# Patient Record
Sex: FEMALE | Race: WHITE | NOT HISPANIC OR LATINO | Employment: UNEMPLOYED | URBAN - METROPOLITAN AREA
[De-identification: names, ages, dates, MRNs, and addresses within clinical notes are randomized per-mention and may not be internally consistent; named-entity substitution may affect disease eponyms.]

---

## 2023-03-16 PROBLEM — J45.909 ASTHMA (HHS-HCC): Status: ACTIVE | Noted: 2023-03-16

## 2023-03-16 PROBLEM — G56.00 CTS (CARPAL TUNNEL SYNDROME): Status: ACTIVE | Noted: 2023-03-16

## 2023-03-16 PROBLEM — M25.539 WRIST PAIN: Status: ACTIVE | Noted: 2023-03-16

## 2023-03-16 PROBLEM — F32.A DEPRESSION: Status: ACTIVE | Noted: 2023-03-16

## 2023-03-16 PROBLEM — G47.33 MILD OBSTRUCTIVE SLEEP APNEA: Status: ACTIVE | Noted: 2023-03-16

## 2023-03-16 PROBLEM — M25.569 KNEE PAIN: Status: ACTIVE | Noted: 2023-03-16

## 2023-03-16 PROBLEM — E55.9 VITAMIN D DEFICIENCY: Status: ACTIVE | Noted: 2023-03-16

## 2023-03-16 PROBLEM — R53.83 FATIGUE: Status: ACTIVE | Noted: 2023-03-16

## 2023-03-16 RX ORDER — ESCITALOPRAM OXALATE 10 MG/1
1 TABLET ORAL DAILY
COMMUNITY
Start: 2022-12-13 | End: 2023-03-20 | Stop reason: SDUPTHER

## 2023-03-16 RX ORDER — ALBUTEROL SULFATE 90 UG/1
2 AEROSOL, METERED RESPIRATORY (INHALATION) EVERY 4 HOURS PRN
COMMUNITY

## 2023-03-16 RX ORDER — AMITRIPTYLINE HYDROCHLORIDE 10 MG/1
TABLET, FILM COATED ORAL
COMMUNITY
Start: 2022-12-13 | End: 2023-03-23 | Stop reason: ALTCHOICE

## 2023-03-16 RX ORDER — ESCITALOPRAM OXALATE 20 MG/1
1 TABLET ORAL DAILY
COMMUNITY
End: 2023-03-20 | Stop reason: SDUPTHER

## 2023-03-16 RX ORDER — NORETHINDRONE ACETATE AND ETHINYL ESTRADIOL .03; 1.5 MG/1; MG/1
1 TABLET ORAL DAILY
COMMUNITY
Start: 2021-10-07 | End: 2023-07-18 | Stop reason: SDUPTHER

## 2023-03-16 RX ORDER — FLUTICASONE PROPIONATE 50 MCG
1 SPRAY, SUSPENSION (ML) NASAL 2 TIMES DAILY
COMMUNITY
Start: 2021-11-12

## 2023-03-16 RX ORDER — CALCIUM CARB/VITAMIN D3/VIT K1 500-500-40
2 TABLET,CHEWABLE ORAL DAILY
COMMUNITY
Start: 2021-11-09

## 2023-03-20 ENCOUNTER — OFFICE VISIT (OUTPATIENT)
Dept: PRIMARY CARE | Facility: CLINIC | Age: 28
End: 2023-03-20
Payer: OTHER GOVERNMENT

## 2023-03-20 VITALS
WEIGHT: 173 LBS | HEIGHT: 61 IN | DIASTOLIC BLOOD PRESSURE: 76 MMHG | TEMPERATURE: 97.3 F | SYSTOLIC BLOOD PRESSURE: 122 MMHG | OXYGEN SATURATION: 98 % | RESPIRATION RATE: 16 BRPM | HEART RATE: 73 BPM | BODY MASS INDEX: 32.66 KG/M2

## 2023-03-20 DIAGNOSIS — E55.9 VITAMIN D DEFICIENCY: ICD-10-CM

## 2023-03-20 DIAGNOSIS — F32.A DEPRESSION, UNSPECIFIED DEPRESSION TYPE: Primary | ICD-10-CM

## 2023-03-20 PROCEDURE — 1036F TOBACCO NON-USER: CPT | Performed by: FAMILY MEDICINE

## 2023-03-20 PROCEDURE — 99213 OFFICE O/P EST LOW 20 MIN: CPT | Performed by: FAMILY MEDICINE

## 2023-03-20 RX ORDER — ESCITALOPRAM OXALATE 10 MG/1
10 TABLET ORAL DAILY
Qty: 30 TABLET | Refills: 3 | Status: SHIPPED | OUTPATIENT
Start: 2023-03-20 | End: 2023-07-18 | Stop reason: SDUPTHER

## 2023-03-20 RX ORDER — ESCITALOPRAM OXALATE 20 MG/1
20 TABLET ORAL DAILY
Qty: 30 TABLET | Refills: 3 | Status: SHIPPED | OUTPATIENT
Start: 2023-03-20 | End: 2023-07-18 | Stop reason: SDUPTHER

## 2023-03-20 NOTE — PROGRESS NOTES
"Subjective   Patient ID: Patricia Cabezas is a 27 y.o. female who presents for Depression, Med Refill, and Eczema.  HPI  27year-old female Adilene Kuhn is here to recheck on her depression.  Taking 30 mg of Lexapro daily and is definitely helped her immensely; no significant social or medical side effects after careful review were discussed  Low vitamin D 2 years ago and takes supplemental vitamin D.  Observing healthy routines and I did review earlier labs.  No headache fever chest pain shortness of palpitations or new GI- issues.  Mood is been stable in the above  Review of Systems   Constitutional:  Negative for appetite change and unexpected weight change.   Eyes:  Negative for visual disturbance.   Respiratory:  Negative for cough, chest tightness and shortness of breath.    Cardiovascular:  Negative for chest pain, palpitations and leg swelling.   Gastrointestinal:  Negative for abdominal pain and nausea.   Genitourinary:  Negative for dysuria and flank pain.   Musculoskeletal:  Positive for myalgias. Negative for arthralgias.   Skin:  Negative for rash.   Neurological:  Negative for dizziness and weakness.   Psychiatric/Behavioral:  Positive for behavioral problems and dysphoric mood. Negative for confusion, sleep disturbance and suicidal ideas. The patient is not nervous/anxious.        Objective   /76 (BP Location: Left arm, Patient Position: Sitting, BP Cuff Size: Adult)   Pulse 73   Temp 36.3 °C (97.3 °F) (Temporal)   Resp 16   Ht 1.549 m (5' 1\")   Wt 78.5 kg (173 lb)   LMP 02/27/2023 (Approximate)   SpO2 98%   BMI 32.69 kg/m²     11/03/21 1745Hemoglobin A1C   Collected: 11/03/21 0935  Final result   Hemoglobin A1C 5.2 %  Estimated Average Glucose 103 MG/DL   11/03/21 1617Comprehensive Metabolic Panel   Collected: 11/03/21 0935  Final result   Glucose 86 mg/dL GLOMERULAR FILTRATION RATE- >60 mL/min/1.73m2    Sodium 138 mmol/L Calcium 9.1 mg/dL   Potassium 3.9 mmol/L Albumin " 4.3 g/dL   Chloride 105 mmol/L Alkaline Phosphatase 57 U/L   Bicarbonate 24 mmol/L Total Protein 7.2 g/dL   Anion Gap 13 mmol/L AST 11 U/L   Urea Nitrogen 10 mg/dL Total Bilirubin 0.3 mg/dL   Creatinine 0.72 mg/dL ALT (SGPT) 12 U/L    GLOMERULAR FILTRATION RATE-NON  >60 mL/min/1.73m2     11/03/21 1447Vitamin D, Total   Collected: 11/03/21 0935  Final result   Vitamin D, 25-Hydroxy 16 Abnormal  ng/mL      11/03/21 1435TSH with reflex to Free T4 if abnormal   Collected: 11/03/21 0935  Final result   TSH 2.30 mIU/L            Physical Exam  Vital signs reviewed and normal  General-inspection of the well-developed well-nourished individual in no acute distress mood is stable  Neck neck is stable without masses adenopathy bruits or rigidity thyroid is normal  Respiratory-chest clear anterior and posteriorly  Cardiovascular-RSR without murmurs gallop or ectopy  Peripheral vascular-no edema or motor sensorivascular deficits  Mood mood is stable without anxiety depressive or cognitive issues      Assessment/Plan   Problem List Items Addressed This Visit          Other    Depression   Good progress, will continue her 30 mg of Lexapro daily.  Can remain off her Elavil  Continue supplemental vitamin D  Continue health routines  We will check vitamin D CBC LDL and CMP before visit with Adilene in about 3 months.  Call if interval problems otherwise good response to above medicines without side effects  @discharge  The above diagnosis and treatment plan was discussed with the patient patient will continue appropriate diet and exercise as reviewed  Patient will recheck earlier if any interval problems of significance or clinical worsening of the above problems.  Agrees above surveillance.  All question were addressed regarding above meds

## 2023-03-23 ASSESSMENT — ENCOUNTER SYMPTOMS
ARTHRALGIAS: 0
APPETITE CHANGE: 0
CHEST TIGHTNESS: 0
DIZZINESS: 0
FLANK PAIN: 0
MYALGIAS: 1
NAUSEA: 0
SHORTNESS OF BREATH: 0
DYSURIA: 0
PALPITATIONS: 0
WEAKNESS: 0
CONFUSION: 0
DYSPHORIC MOOD: 1
COUGH: 0
UNEXPECTED WEIGHT CHANGE: 0
SLEEP DISTURBANCE: 0
NERVOUS/ANXIOUS: 0
ABDOMINAL PAIN: 0

## 2023-07-18 ENCOUNTER — OFFICE VISIT (OUTPATIENT)
Dept: PRIMARY CARE | Facility: CLINIC | Age: 28
End: 2023-07-18
Payer: OTHER GOVERNMENT

## 2023-07-18 VITALS
SYSTOLIC BLOOD PRESSURE: 120 MMHG | OXYGEN SATURATION: 98 % | TEMPERATURE: 97.9 F | RESPIRATION RATE: 16 BRPM | WEIGHT: 185.6 LBS | BODY MASS INDEX: 35.07 KG/M2 | HEART RATE: 96 BPM | DIASTOLIC BLOOD PRESSURE: 76 MMHG

## 2023-07-18 DIAGNOSIS — R63.5 WEIGHT GAIN: Primary | ICD-10-CM

## 2023-07-18 DIAGNOSIS — F32.A DEPRESSION, UNSPECIFIED DEPRESSION TYPE: ICD-10-CM

## 2023-07-18 DIAGNOSIS — N92.6 IRREGULAR MENSES: ICD-10-CM

## 2023-07-18 PROCEDURE — 1036F TOBACCO NON-USER: CPT | Performed by: FAMILY MEDICINE

## 2023-07-18 PROCEDURE — 99213 OFFICE O/P EST LOW 20 MIN: CPT | Performed by: FAMILY MEDICINE

## 2023-07-18 RX ORDER — PHENTERMINE HYDROCHLORIDE 37.5 MG/1
37.5 CAPSULE ORAL
Qty: 30 CAPSULE | Refills: 0 | Status: SHIPPED | OUTPATIENT
Start: 2023-07-18 | End: 2023-08-22 | Stop reason: SDUPTHER

## 2023-07-18 RX ORDER — ESCITALOPRAM OXALATE 10 MG/1
10 TABLET ORAL DAILY
Qty: 90 TABLET | Refills: 1 | Status: SHIPPED | OUTPATIENT
Start: 2023-07-18 | End: 2024-02-21 | Stop reason: SDUPTHER

## 2023-07-18 RX ORDER — ESCITALOPRAM OXALATE 20 MG/1
20 TABLET ORAL DAILY
Qty: 90 TABLET | Refills: 1 | Status: SHIPPED | OUTPATIENT
Start: 2023-07-18 | End: 2024-02-21 | Stop reason: SDUPTHER

## 2023-07-18 RX ORDER — NORETHINDRONE ACETATE AND ETHINYL ESTRADIOL .03; 1.5 MG/1; MG/1
1 TABLET ORAL DAILY
Qty: 21 TABLET | Refills: 11 | Status: SHIPPED | OUTPATIENT
Start: 2023-07-18

## 2023-07-18 NOTE — PROGRESS NOTES
"Subjective   Patient ID: Patricia Cabezas is a 27 y.o. female who presents for Establish Care.    HPI  Presents today for above reason  Last PCP: Adilene Aguirre NP   How did pt find us: Dr. Tucker referred pt here  Last eye exam: January 2023  Last dental: Jan 2023  Last PAP: Unknown  Eating healthy. Including: chicken, fish, fruit, vegetables  Exercise is 30 min daily walk  Sleeping well  Working as: [employer]  Pt has 3 children: 8,7 and 2    Discuss Adipex prescription    Pt had RIGHT carpal tunnel surgery 2022  Pt has cyst on wrist  Pt felt a \"pop\" several days ago and now hand is tingly and weak.  Should she return to the surgeon who do this?      Review of Systems  Constitutional:  no chills, no fever and no night sweats.  Eyes: no blurred vision and no eyesight problems.  ENT: no hearing loss, no nasal congestion, no hoarseness and no sore throat.  Neck: no mass (es) and no swelling.  Cardiovascular: no chest pain, no intermittent leg claudication, no lower extremity edema, no palpitation and no syncope.  Respiratory: no cough, no shortness of breath during exertion, no shortness of breath at rest and no wheezing.  Gastrointestinal: no abdominal pain, no blood in stools, no constipation, no diarrhea, no melena, no nausea, no rectal pain and no vomiting.  Genitourinary: no dysuria, no change in urinary frequency, no urinary hesitancy and no feelings of urinary urgency.  Musculoskeletal: no arthralgias, no back pain and no myalgias.  Integumentary: no new skin lesions and no rashes.  Neurological: no difficulty walking, no headache, no limb weakness, no numbness and no tingling.  Psychiatric/Behavioral: no anxiety, no depression, no anhedonia and no substance use disorders.  Endocrine: no recent weight gain and no recent weight loss.  Hematologic/Lymphatic: no tendency for easy bruising and no swollen glands    Objective   Physical Exam  Patient here to reestablish had been a patient of one of the nurse practitioners " for left history of anxiety and depression stable on current medication has some complaints of cyst in the right wrist has had carpal tunnel surgery last year felt a pop the other day the cyst was there before nontender little numbness and tingling in the forearm but it is resolving.  Physical exam today's office visit constitutional alert and oriented x3.    Head is atraumatic HEENT is within normal limits.    Neck supple no masses full range of motion.    Thyroid is normal in size no thyromegaly there is no carotid bruits.    Pulmonary exam shows clear to auscultation no respiratory distress.    Cardiovascular shows no murmur rub or gallop.  Regular rate and rhythm.    Abdominal exam soft nontender no hepatosplenomegaly or masses normal bowel sounds no rebound no guarding.    Musculoskeletal exam no joint pain no muscle pain full range of motion.    Psych exam normal mood and affect.    Dermatologic exam no skin lesions no rash no blemishes.    Neuro exam is no focal deficits.  Normal exam.    Extremities no edema normal pulses normal capillary refill.    /76   Pulse 96   Temp 36.6 °C (97.9 °F)   Resp 16   Wt 84.2 kg (185 lb 9.6 oz)   SpO2 98%   BMI 35.07 kg/m²     Lab Results   Component Value Date    WBC 7.2 11/03/2021    HGB 12.9 11/03/2021    HCT 41.4 11/03/2021    MCV 88 11/03/2021     11/03/2021       Assessment/Plan no new complaints just needs refill on her chronic medications which are working well we will do that she is also gained some weight has lost 30 pounds has gained 15-20 of it back and done well on Adipex in the past would like to restart that advised she needs to make sure she does not lifestyle change she was out of her anxiety medicine with stress eating.  Advised not to let that happen again.  On birth control pills mostly for irregular menses that have stabilized on the medication.  Problem List Items Addressed This Visit       Depression     Other Visit Diagnoses        Irregular menses

## 2023-08-22 ENCOUNTER — OFFICE VISIT (OUTPATIENT)
Dept: PRIMARY CARE | Facility: CLINIC | Age: 28
End: 2023-08-22
Payer: OTHER GOVERNMENT

## 2023-08-22 ENCOUNTER — APPOINTMENT (OUTPATIENT)
Dept: PRIMARY CARE | Facility: CLINIC | Age: 28
End: 2023-08-22
Payer: OTHER GOVERNMENT

## 2023-08-22 VITALS
DIASTOLIC BLOOD PRESSURE: 80 MMHG | TEMPERATURE: 97.8 F | BODY MASS INDEX: 33.52 KG/M2 | SYSTOLIC BLOOD PRESSURE: 110 MMHG | OXYGEN SATURATION: 97 % | WEIGHT: 177.4 LBS | HEART RATE: 83 BPM | RESPIRATION RATE: 18 BRPM

## 2023-08-22 DIAGNOSIS — R63.5 WEIGHT GAIN: ICD-10-CM

## 2023-08-22 PROCEDURE — 1036F TOBACCO NON-USER: CPT | Performed by: FAMILY MEDICINE

## 2023-08-22 PROCEDURE — 99213 OFFICE O/P EST LOW 20 MIN: CPT | Performed by: FAMILY MEDICINE

## 2023-08-22 RX ORDER — PHENTERMINE HYDROCHLORIDE 37.5 MG/1
37.5 CAPSULE ORAL
Qty: 30 CAPSULE | Refills: 0 | Status: SHIPPED | OUTPATIENT
Start: 2023-08-22 | End: 2023-09-18 | Stop reason: SDUPTHER

## 2023-08-22 NOTE — PROGRESS NOTES
Subjective   Patient ID: Patricia Cabezas is a 27 y.o. female who presents for Weight Management.  HPI  Weight loss management Follow up   Taking Adipex 37.5 mg   Following up for month # 2  Has been eating a generally healthy diet  Exercises 5 x per week  What type of exercise walks 30 minutes  Patient last in office weight 185.9 lbs  Today's in office weight 177.4 lbs   Patient is - 8.5 lbs   Has previously taken no medications. Pt has been on Adipex before   Co morbidities include- Major depression  Any side effects noted? none      Review of Systems  Constitutional:  no chills, no fever and no night sweats.  Eyes: no blurred vision and no eyesight problems.  ENT: no hearing loss, no nasal congestion, no hoarseness and no sore throat.  Neck: no mass (es) and no swelling.  Cardiovascular: no chest pain, no intermittent leg claudication, no lower extremity edema, no palpitation and no syncope.  Respiratory: no cough, no shortness of breath during exertion, no shortness of breath at rest and no wheezing.  Gastrointestinal: no abdominal pain, no blood in stools, no constipation, no diarrhea, no melena, no nausea, no rectal pain and no vomiting.  Genitourinary: no dysuria, no change in urinary frequency, no urinary hesitancy and no feelings of urinary urgency.  Musculoskeletal: no arthralgias, no back pain and no myalgias.  Integumentary: no new skin lesions and no rashes.  Neurological: no difficulty walking, no headache, no limb weakness, no numbness and no tingling.  Psychiatric/Behavioral: no anxiety, no depression, no anhedonia and no substance use disorders.  Endocrine: no recent weight gain and no recent weight loss.  Hematologic/Lymphatic: no tendency for easy bruising and no swollen glands    Objective   Patient in for follow-up since starting Adipex down 8-1/2 pounds doing well sleeping well blood pressure is good no tachycardia.  Lungs clear cardiac and abdominal exams are benign.  Physical Exam    There were  no vitals taken for this visit.    Lab Results   Component Value Date    WBC 7.2 11/03/2021    HGB 12.9 11/03/2021    HCT 41.4 11/03/2021    MCV 88 11/03/2021     11/03/2021       Assessment/Plan assessments weight gain on Adipex doing well down 8 and half pounds plan refill  Patient will benefit from Phentermine Therapy, given the following:    Advised to take Adipex 37.5 mg in the morning 1-2 hours after breakfast  [place] reviewed today  CSA Adipex 8/22/2023  Does  the patient demonstrate dedication to weight loss treatment plan? yes    Benefits, risks, possible adverse effects to the medication discussed today            Problem List Items Addressed This Visit    None

## 2023-09-18 DIAGNOSIS — R63.5 WEIGHT GAIN: ICD-10-CM

## 2023-09-18 RX ORDER — PHENTERMINE HYDROCHLORIDE 37.5 MG/1
37.5 CAPSULE ORAL
Qty: 30 CAPSULE | Refills: 0 | Status: SHIPPED | OUTPATIENT
Start: 2023-09-18 | End: 2023-10-26 | Stop reason: SDUPTHER

## 2023-09-18 NOTE — TELEPHONE ENCOUNTER
Pt is calling because she states you told her to just call when she is due for her refill    Rx Refill Request Telephone Encounter    Name:  Patricia Cabezas  : 1995     Medication Name:  Phentermine  Dose (Optional):    37.5mg  Quantity (Optional):    #30  Directions (Optional):   Take 1 capsule (37.5mg) by mouth once daily in the morning. Take before meals    ALLERGIES:   nkda     Specific Pharmacy location:  [facility]    Date of last appointment:  23  Date of next appointment:  none    Best number to reach patient:  [phone]

## 2023-10-25 ENCOUNTER — APPOINTMENT (OUTPATIENT)
Dept: PRIMARY CARE | Facility: CLINIC | Age: 28
End: 2023-10-25
Payer: OTHER GOVERNMENT

## 2023-10-25 NOTE — PROGRESS NOTES
Subjective   Patient ID: Patricia Cabezas is a 27 y.o. female who presents for Med Refill.  [facility]  Weight loss management Follow up   Taking Adipex 37.5 mg   Following up for month # 4  Has been eating a generally healthy diet  Exercises 5 x per week  What type of exercise walks  Patient last in office weight 177.6 lbs  Today's in office weight 166.4 lbs   Patient is - 11.2 lbs   Co morbidities include- Major depression  Any side effects noted? NO    No other concerns today    Review of Systems  Constitutional:  no chills, no fever and no night sweats.  Eyes: no blurred vision and no eyesight problems.  ENT: no hearing loss, no nasal congestion, no hoarseness and no sore throat.  Neck: no mass (es) and no swelling.  Cardiovascular: no chest pain, no intermittent leg claudication, no lower extremity edema, no palpitation and no syncope.  Respiratory: no cough, no shortness of breath during exertion, no shortness of breath at rest and no wheezing.  Gastrointestinal: no abdominal pain, no blood in stools, no constipation, no diarrhea, no melena, no nausea, no rectal pain and no vomiting.  Genitourinary: no dysuria, no change in urinary frequency, no urinary hesitancy and no feelings of urinary urgency.  Musculoskeletal: no arthralgias, no back pain and no myalgias.  Integumentary: no new skin lesions and no rashes.  Neurological: no difficulty walking, no headache, no limb weakness, no numbness and no tingling.  Psychiatric/Behavioral: no anxiety, no depression, no anhedonia and no substance use disorders.  Endocrine: no recent weight gain and no recent weight loss.  Hematologic/Lymphatic: no tendency for easy bruising and no swollen glands    Objective   Physical Exam  Patient in for follow-up of weight gain on Adipex down another 11 pounds doing well no complaints.  Blood pressure is good sleeping good appetite suppressed only.  Well-developed well-nourished young woman in no acute distress physical exam today's office  visit constitutional alert and oriented x3.    Head is atraumatic HEENT is within normal limits.    Neck supple no masses full range of motion.    Thyroid is normal in size no thyromegaly there is no carotid bruits.    Pulmonary exam shows clear to auscultation no respiratory distress.    Cardiovascular shows no murmur rub or gallop.  Regular rate and rhythm.    Abdominal exam soft nontender no hepatosplenomegaly or masses normal bowel sounds no rebound no guarding.    Musculoskeletal exam no joint pain no muscle pain full range of motion.    Psych exam normal mood and affect.    Dermatologic exam no skin lesions no rash no blemishes.    Neuro exam is no focal deficits.  Normal exam.    Extremities no edema normal pulses normal capillary refill.    /82   Pulse 86   Temp 36.4 °C (97.6 °F)   Resp 18   Wt 75.5 kg (166 lb 6.4 oz)   SpO2 98%   BMI 31.44 kg/m²     Lab Results   Component Value Date    WBC 7.2 11/03/2021    HGB 12.9 11/03/2021    HCT 41.4 11/03/2021    MCV 88 11/03/2021     11/03/2021       Assessment/Plan plan is continue current medication routine recheck 3 months  Patient will benefit from Phentermine Therapy, given the following:    Advised to take Adipex 37.5 mg in the morning 1-2 hours after breakfast  [place]AR[place] reviewed today  CSA Adipex 8/22/2023  Does  the patient demonstrate dedication to weight loss treatment plan? YES    Benefits, risks, possible adverse effects to the medication discussed today            Problem List Items Addressed This Visit    None  Visit Diagnoses       Weight gain

## 2023-10-26 ENCOUNTER — OFFICE VISIT (OUTPATIENT)
Dept: PRIMARY CARE | Facility: CLINIC | Age: 28
End: 2023-10-26
Payer: OTHER GOVERNMENT

## 2023-10-26 VITALS
SYSTOLIC BLOOD PRESSURE: 122 MMHG | WEIGHT: 166.4 LBS | TEMPERATURE: 97.6 F | RESPIRATION RATE: 18 BRPM | OXYGEN SATURATION: 98 % | HEART RATE: 86 BPM | BODY MASS INDEX: 31.44 KG/M2 | DIASTOLIC BLOOD PRESSURE: 82 MMHG

## 2023-10-26 DIAGNOSIS — R63.5 WEIGHT GAIN: ICD-10-CM

## 2023-10-26 PROCEDURE — 1036F TOBACCO NON-USER: CPT | Performed by: FAMILY MEDICINE

## 2023-10-26 PROCEDURE — 99213 OFFICE O/P EST LOW 20 MIN: CPT | Performed by: FAMILY MEDICINE

## 2023-10-26 RX ORDER — PHENTERMINE HYDROCHLORIDE 37.5 MG/1
37.5 CAPSULE ORAL
Qty: 30 CAPSULE | Refills: 0 | Status: SHIPPED | OUTPATIENT
Start: 2023-10-26 | End: 2023-12-05 | Stop reason: SDUPTHER

## 2023-12-05 DIAGNOSIS — R63.5 WEIGHT GAIN: ICD-10-CM

## 2023-12-05 RX ORDER — PHENTERMINE HYDROCHLORIDE 37.5 MG/1
37.5 CAPSULE ORAL
Qty: 30 CAPSULE | Refills: 0 | Status: SHIPPED | OUTPATIENT
Start: 2023-12-05 | End: 2024-01-09 | Stop reason: SDUPTHER

## 2023-12-05 NOTE — TELEPHONE ENCOUNTER
Rx Controlled Refill Request Telephone Encounter    Name: Patricia Cabezas  :  1995    Medication Name:   adipex  Dose (Optional):   37.5 mg  Quantity (Optional):   30  Directions (Optional):   1 capsule daily    ALLERGIES:    on file    LAST DRUG SCREEN:       LAST MED CONTRACT:    23    Specific Pharmacy location:    [facility]    Date of last appointment:    10/26/23  Date of next appointment:    none    Best number to reach patient:    [phone]

## 2024-01-09 DIAGNOSIS — R63.5 WEIGHT GAIN: ICD-10-CM

## 2024-01-09 RX ORDER — PHENTERMINE HYDROCHLORIDE 37.5 MG/1
37.5 CAPSULE ORAL
Qty: 30 CAPSULE | Refills: 0 | Status: SHIPPED | OUTPATIENT
Start: 2024-01-09 | End: 2024-02-12 | Stop reason: SDUPTHER

## 2024-01-09 NOTE — TELEPHONE ENCOUNTER
Rx Controlled Refill Request Telephone Encounter    Name: Patricia Cabezas  :  1995    Medication Name:   ADIPEX  Dose (Optional):   37.5 MG  Quantity (Optional):   30  Directions (Optional):   1 CAPSULE DAILY    ALLERGIES:    ON FILE    LAST DRUG SCREEN:       LAST MED CONTRACT:    23    Specific Pharmacy location:    [facility]    Date of last appointment:    10/26/23  Date of next appointment:    NONE    Best number to reach patient:    [phone]

## 2024-02-12 DIAGNOSIS — R63.5 WEIGHT GAIN: ICD-10-CM

## 2024-02-12 RX ORDER — PHENTERMINE HYDROCHLORIDE 37.5 MG/1
37.5 CAPSULE ORAL
Qty: 30 CAPSULE | Refills: 0 | Status: SHIPPED | OUTPATIENT
Start: 2024-02-12 | End: 2024-03-13 | Stop reason: SDUPTHER

## 2024-02-12 NOTE — TELEPHONE ENCOUNTER
Rx Refill Request Telephone Encounter    Name:  Patricia Cabezas  : 1995     Medication Name:  phentermine 37.5 mg capsule   Quantity (Optional):    30  Directions (Optional):   Take 1 capsule (37.5 mg) by mouth once daily in the morning. Take before meals.     [facility] [address]     Date of last appointment:  10/26/23

## 2024-02-21 DIAGNOSIS — F32.A DEPRESSION, UNSPECIFIED DEPRESSION TYPE: ICD-10-CM

## 2024-02-21 RX ORDER — ESCITALOPRAM OXALATE 10 MG/1
10 TABLET ORAL DAILY
Qty: 90 TABLET | Refills: 0 | Status: SHIPPED | OUTPATIENT
Start: 2024-02-21 | End: 2024-05-14 | Stop reason: SDUPTHER

## 2024-02-21 RX ORDER — ESCITALOPRAM OXALATE 20 MG/1
20 TABLET ORAL DAILY
Qty: 90 TABLET | Refills: 0 | Status: SHIPPED | OUTPATIENT
Start: 2024-02-21 | End: 2024-02-23 | Stop reason: SDUPTHER

## 2024-02-21 NOTE — TELEPHONE ENCOUNTER
Rx Refill Request Telephone Encounter    Name:  Patricia Cabezas  : 1995     Medication Name:  LEXAPRO  Dose (Optional):    10 MG  Quantity (Optional):    30  Directions (Optional):   TAKE DAILY    Medication Name:  LEXAPRO  Dose (Optional):    20 MG  Quantity (Optional):    30  Directions (Optional):   TAKE DAILY    ALLERGIES:   NKDA    Specific Pharmacy location:  [facility]    Date of last appointment:  10/26/2023  Date of next appointment:  NONE    Best number to reach patient:  [phone]

## 2024-02-23 RX ORDER — ESCITALOPRAM OXALATE 20 MG/1
20 TABLET ORAL DAILY
Qty: 90 TABLET | Refills: 0 | Status: SHIPPED | OUTPATIENT
Start: 2024-02-23 | End: 2024-05-14 | Stop reason: SDUPTHER

## 2024-03-12 ENCOUNTER — TELEPHONE (OUTPATIENT)
Dept: PRIMARY CARE | Facility: CLINIC | Age: 29
End: 2024-03-12
Payer: OTHER GOVERNMENT

## 2024-03-12 NOTE — TELEPHONE ENCOUNTER
Rx Controlled Refill Request Telephone Encounter    Name: Patricia Cabezas  :  1995    Medication Name:   Adipex  Dose (Optional):   37.5 mg  Quantity (Optional):   #30  Directions (Optional):   Take 1 capsule (37.5 mg) by mouth once daily in the morning. Take before meals.    ALLERGIES:    nkda     LAST DRUG SCREEN:     23  LAST MED CONTRACT:    23    Specific Pharmacy location:    [facility]     Date of last appointment:    10/26/23  Date of next appointment:    none     Best number to reach patient:    [phone]

## 2024-03-13 ENCOUNTER — OFFICE VISIT (OUTPATIENT)
Dept: PRIMARY CARE | Facility: CLINIC | Age: 29
End: 2024-03-13
Payer: OTHER GOVERNMENT

## 2024-03-13 VITALS
BODY MASS INDEX: 28.6 KG/M2 | DIASTOLIC BLOOD PRESSURE: 72 MMHG | OXYGEN SATURATION: 98 % | HEART RATE: 99 BPM | WEIGHT: 155.4 LBS | HEIGHT: 62 IN | SYSTOLIC BLOOD PRESSURE: 112 MMHG

## 2024-03-13 DIAGNOSIS — E78.2 MIXED HYPERLIPIDEMIA: Primary | ICD-10-CM

## 2024-03-13 DIAGNOSIS — R63.5 WEIGHT GAIN: ICD-10-CM

## 2024-03-13 PROCEDURE — 99213 OFFICE O/P EST LOW 20 MIN: CPT | Performed by: FAMILY MEDICINE

## 2024-03-13 PROCEDURE — 1036F TOBACCO NON-USER: CPT | Performed by: FAMILY MEDICINE

## 2024-03-13 RX ORDER — PHENTERMINE HYDROCHLORIDE 37.5 MG/1
37.5 CAPSULE ORAL
Qty: 30 CAPSULE | Refills: 0 | Status: SHIPPED | OUTPATIENT
Start: 2024-03-13 | End: 2024-04-16 | Stop reason: SDUPTHER

## 2024-03-13 NOTE — PROGRESS NOTES
Subjective   Patient ID: Patricia Cabezas is a 28 y.o. female who presents for Weight Management.  HPI  Weight loss management Follow up   Taking Adipex 37.5 mg   Following up for month # 7-8  Has been eating a generally healthy diet  Exercises 5 x per week  What type of exercise walking when it is nice out  Patient last in office weight 166 lbs  Today's in office weight 155.4 lbs   Patient is - 10 lbs   Has previously taken nothing   Co morbidities include- Major depression  Any side effects noted? none      Review of Systems  Constitutional:  no chills, no fever and no night sweats.  Eyes: no blurred vision and no eyesight problems.  ENT: no hearing loss, no nasal congestion, no hoarseness and no sore throat.  Neck: no mass (es) and no swelling.  Cardiovascular: no chest pain, no intermittent leg claudication, no lower extremity edema, no palpitation and no syncope.  Respiratory: no cough, no shortness of breath during exertion, no shortness of breath at rest and no wheezing.  Gastrointestinal: no abdominal pain, no blood in stools, no constipation, no diarrhea, no melena, no nausea, no rectal pain and no vomiting.  Genitourinary: no dysuria, no change in urinary frequency, no urinary hesitancy and no feelings of urinary urgency.  Musculoskeletal: no arthralgias, no back pain and no myalgias.  Integumentary: no new skin lesions and no rashes.  Neurological: no difficulty walking, no headache, no limb weakness, no numbness and no tingling.  Psychiatric/Behavioral: no anxiety, no depression, no anhedonia and no substance use disorders.  Endocrine: no recent weight gain and no recent weight loss.  Hematologic/Lymphatic: no tendency for easy bruising and no swollen glands    Objective   Patient here for follow-up down 22 pounds would like to try to lose 10 more.  Doing well sleeping well no tachycardia blood pressure is excellent.  Lungs clear cardiac and abdominal exams are benign.  Physical Exam    /72   Pulse  "99   Ht 1.575 m (5' 2\")   Wt 70.5 kg (155 lb 6.4 oz)   SpO2 98%   BMI 28.42 kg/m²     Lab Results   Component Value Date    WBC 7.2 11/03/2021    HGB 12.9 11/03/2021    HCT 41.4 11/03/2021    MCV 88 11/03/2021     11/03/2021       Assessment/Plan plan continue current treatment refill Adipex follow-up in 6 months or as needed  Patient will benefit from Phentermine Therapy, given the following:    Advised to take Adipex  mg in the morning 1-2 hours after breakfast  [place]AR[place] reviewed today    Does  the patient demonstrate dedication to weight loss treatment plan? yes    Benefits, risks, possible adverse effects to the medication discussed today            Problem List Items Addressed This Visit    None  Visit Diagnoses       Weight gain                "

## 2024-03-19 ENCOUNTER — TELEPHONE (OUTPATIENT)
Dept: PRIMARY CARE | Facility: CLINIC | Age: 29
End: 2024-03-19

## 2024-03-19 ENCOUNTER — LAB (OUTPATIENT)
Dept: LAB | Facility: LAB | Age: 29
End: 2024-03-19
Payer: OTHER GOVERNMENT

## 2024-03-19 DIAGNOSIS — E78.2 MIXED HYPERLIPIDEMIA: ICD-10-CM

## 2024-03-19 DIAGNOSIS — R63.5 WEIGHT GAIN: ICD-10-CM

## 2024-03-19 LAB
ALBUMIN SERPL BCP-MCNC: 4.3 G/DL (ref 3.4–5)
ALP SERPL-CCNC: 51 U/L (ref 33–110)
ALT SERPL W P-5'-P-CCNC: 8 U/L (ref 7–45)
ANION GAP SERPL CALC-SCNC: 12 MMOL/L (ref 10–20)
AST SERPL W P-5'-P-CCNC: 8 U/L (ref 9–39)
BASOPHILS # BLD AUTO: 0.05 X10*3/UL (ref 0–0.1)
BASOPHILS NFR BLD AUTO: 0.5 %
BILIRUB SERPL-MCNC: 0.3 MG/DL (ref 0–1.2)
BUN SERPL-MCNC: 12 MG/DL (ref 6–23)
CALCIUM SERPL-MCNC: 9.2 MG/DL (ref 8.6–10.3)
CHLORIDE SERPL-SCNC: 103 MMOL/L (ref 98–107)
CHOLEST SERPL-MCNC: 230 MG/DL (ref 0–199)
CHOLESTEROL/HDL RATIO: 5
CO2 SERPL-SCNC: 26 MMOL/L (ref 21–32)
CREAT SERPL-MCNC: 0.84 MG/DL (ref 0.5–1.05)
EGFRCR SERPLBLD CKD-EPI 2021: >90 ML/MIN/1.73M*2
EOSINOPHIL # BLD AUTO: 0.09 X10*3/UL (ref 0–0.7)
EOSINOPHIL NFR BLD AUTO: 0.9 %
ERYTHROCYTE [DISTWIDTH] IN BLOOD BY AUTOMATED COUNT: 12.5 % (ref 11.5–14.5)
GLUCOSE SERPL-MCNC: 74 MG/DL (ref 74–99)
HCT VFR BLD AUTO: 40.2 % (ref 36–46)
HDLC SERPL-MCNC: 45.7 MG/DL
HGB BLD-MCNC: 13.1 G/DL (ref 12–16)
IMM GRANULOCYTES # BLD AUTO: 0.02 X10*3/UL (ref 0–0.7)
IMM GRANULOCYTES NFR BLD AUTO: 0.2 % (ref 0–0.9)
LDLC SERPL CALC-MCNC: 164 MG/DL
LYMPHOCYTES # BLD AUTO: 2.22 X10*3/UL (ref 1.2–4.8)
LYMPHOCYTES NFR BLD AUTO: 22.5 %
MCH RBC QN AUTO: 27.6 PG (ref 26–34)
MCHC RBC AUTO-ENTMCNC: 32.6 G/DL (ref 32–36)
MCV RBC AUTO: 85 FL (ref 80–100)
MONOCYTES # BLD AUTO: 0.56 X10*3/UL (ref 0.1–1)
MONOCYTES NFR BLD AUTO: 5.7 %
NEUTROPHILS # BLD AUTO: 6.91 X10*3/UL (ref 1.2–7.7)
NEUTROPHILS NFR BLD AUTO: 70.2 %
NON HDL CHOLESTEROL: 184 MG/DL (ref 0–149)
NRBC BLD-RTO: 0 /100 WBCS (ref 0–0)
PLATELET # BLD AUTO: 302 X10*3/UL (ref 150–450)
POTASSIUM SERPL-SCNC: 4.3 MMOL/L (ref 3.5–5.3)
PROT SERPL-MCNC: 7 G/DL (ref 6.4–8.2)
RBC # BLD AUTO: 4.74 X10*6/UL (ref 4–5.2)
SODIUM SERPL-SCNC: 137 MMOL/L (ref 136–145)
TRIGL SERPL-MCNC: 100 MG/DL (ref 0–149)
VLDL: 20 MG/DL (ref 0–40)
WBC # BLD AUTO: 9.9 X10*3/UL (ref 4.4–11.3)

## 2024-03-19 PROCEDURE — 36415 COLL VENOUS BLD VENIPUNCTURE: CPT

## 2024-03-19 PROCEDURE — 80053 COMPREHEN METABOLIC PANEL: CPT

## 2024-03-19 PROCEDURE — 80061 LIPID PANEL: CPT

## 2024-03-19 PROCEDURE — 85025 COMPLETE CBC W/AUTO DIFF WBC: CPT

## 2024-03-19 NOTE — TELEPHONE ENCOUNTER
----- Message from Fabio Way MD sent at 3/19/2024  1:30 PM EDT -----  Labs normal except for elevated cholesterol and LDL.  Watch diet recheck fasting lipid panel in 4 months.  If still elevated would recommend starting a statin

## 2024-03-19 NOTE — TELEPHONE ENCOUNTER
----- Message from Patricia Raulito sent at 3/19/2024  4:03 PM EDT -----  Regarding: Labs  Contact: [phone]  Hi! Thank you for responding. I do have a family history of high cholesterol. I will be moving at the beginning of June to [place]. I’m wondering if he wants me to wait until I move and find a doctor or if he would like me to get labs done again before moving? Thank you!

## 2024-03-20 ENCOUNTER — TELEPHONE (OUTPATIENT)
Dept: PRIMARY CARE | Facility: CLINIC | Age: 29
End: 2024-03-20
Payer: OTHER GOVERNMENT

## 2024-03-20 NOTE — TELEPHONE ENCOUNTER
Fabio Way MD  Do [id number] [facility] Clinical Support Staff19 hours ago (5:02 PM)       I did get the cholesterol checked before she moves that we will know if it is getting any better or not and she will need to worry about getting a physician as soon as she mows.       [employer] message sent

## 2024-03-20 NOTE — TELEPHONE ENCOUNTER
This can be normal for some people.  She does not have a circulation problem.  If it is cold outside she should wear socks or slippers then       [employer] message sent

## 2024-03-20 NOTE — TELEPHONE ENCOUNTER
----- Message from Patricia Raulito sent at 3/19/2024  4:28 PM EDT -----  Regarding: Labs  Contact: [phone]  I was just thinking. I have no idea if this could be related or not. A few months ago I noticed that my feet are always cold. They never used to be that way and now I feel like I can never get them warm. I can be sweating and my feet will still be cold. I also noticed that along with this if I place them on a bar or something similar they turn blueish/purpleish.

## 2024-04-16 DIAGNOSIS — R63.5 WEIGHT GAIN: ICD-10-CM

## 2024-04-16 RX ORDER — PHENTERMINE HYDROCHLORIDE 37.5 MG/1
37.5 CAPSULE ORAL
Qty: 30 CAPSULE | Refills: 0 | Status: SHIPPED | OUTPATIENT
Start: 2024-04-16 | End: 2024-05-15 | Stop reason: SDUPTHER

## 2024-04-16 NOTE — TELEPHONE ENCOUNTER
Rx Controlled Refill Request Telephone Encounter    Name: Patricia Cabezas  :  1995    Medication Name:   ADIPEX  Dose (Optional):   37.5 MG  Quantity (Optional):   30  Directions (Optional):   TAKE DAILY    ALLERGIES:    NKDA    LAST DRUG SCREEN:       LAST MED CONTRACT:    2023    Specific Pharmacy location:    [facility]    Date of last appointment:    2024  Date of next appointment:    NONE    Best number to reach patient:    [phone]

## 2024-05-10 ENCOUNTER — LAB (OUTPATIENT)
Dept: LAB | Facility: LAB | Age: 29
End: 2024-05-10
Payer: OTHER GOVERNMENT

## 2024-05-10 DIAGNOSIS — E78.2 MIXED HYPERLIPIDEMIA: ICD-10-CM

## 2024-05-10 LAB
CHOLEST SERPL-MCNC: 228 MG/DL (ref 0–199)
CHOLESTEROL/HDL RATIO: 4.9
HDLC SERPL-MCNC: 46.2 MG/DL
LDLC SERPL CALC-MCNC: 161 MG/DL
NON HDL CHOLESTEROL: 182 MG/DL (ref 0–149)
TRIGL SERPL-MCNC: 105 MG/DL (ref 0–149)
VLDL: 21 MG/DL (ref 0–40)

## 2024-05-10 PROCEDURE — 36415 COLL VENOUS BLD VENIPUNCTURE: CPT

## 2024-05-10 PROCEDURE — 80061 LIPID PANEL: CPT

## 2024-05-14 ENCOUNTER — TELEPHONE (OUTPATIENT)
Dept: PRIMARY CARE | Facility: CLINIC | Age: 29
End: 2024-05-14
Payer: OTHER GOVERNMENT

## 2024-05-14 DIAGNOSIS — F32.A DEPRESSION, UNSPECIFIED DEPRESSION TYPE: ICD-10-CM

## 2024-05-14 DIAGNOSIS — E78.5 HYPERLIPIDEMIA, UNSPECIFIED HYPERLIPIDEMIA TYPE: ICD-10-CM

## 2024-05-14 RX ORDER — ESCITALOPRAM OXALATE 20 MG/1
20 TABLET ORAL DAILY
Qty: 90 TABLET | Refills: 0 | Status: SHIPPED | OUTPATIENT
Start: 2024-05-14

## 2024-05-14 RX ORDER — ESCITALOPRAM OXALATE 10 MG/1
10 TABLET ORAL DAILY
Qty: 90 TABLET | Refills: 0 | Status: SHIPPED | OUTPATIENT
Start: 2024-05-14

## 2024-05-14 RX ORDER — ROSUVASTATIN CALCIUM 10 MG/1
10 TABLET, COATED ORAL DAILY
Qty: 100 TABLET | Refills: 3 | Status: SHIPPED | OUTPATIENT
Start: 2024-05-14 | End: 2025-06-18

## 2024-05-14 NOTE — TELEPHONE ENCOUNTER
Patient is aware. Patient would like to start the medication.     Patient would like an early refill on medications due to [place] and needing to reestablish with a new physician. Patient would like the Lexpro 10mg and 20mg and the Loestrin.  Please advise.    Medications pended if approved.       ---- Message from Fabio Way MD sent at 5/14/2024  7:53 AM EDT -----  Cholesterol levels remain significantly elevated.  Recommend either significant dietary modification with a recheck of the lipids in 6 months or starting 10 mg of oral generic Crestor

## 2024-05-15 DIAGNOSIS — R63.5 WEIGHT GAIN: ICD-10-CM

## 2024-05-15 RX ORDER — PHENTERMINE HYDROCHLORIDE 37.5 MG/1
37.5 CAPSULE ORAL
Qty: 30 CAPSULE | Refills: 0 | Status: SHIPPED | OUTPATIENT
Start: 2024-05-15 | End: 2024-06-14

## 2024-05-15 NOTE — TELEPHONE ENCOUNTER
Patricia SEYMOUR Do [id number] [facility] Clinical Support Staff (supporting Fabio Way MD)7 minutes ago (1:40 PM)     Hello, tomorrow my phentermine is out and I’m wondering if I can get a refill? Thank you.     Medication pended